# Patient Record
Sex: MALE | NOT HISPANIC OR LATINO
[De-identification: names, ages, dates, MRNs, and addresses within clinical notes are randomized per-mention and may not be internally consistent; named-entity substitution may affect disease eponyms.]

---

## 2024-09-30 ENCOUNTER — DASHBOARD ENCOUNTERS (OUTPATIENT)
Age: 74
End: 2024-09-30

## 2024-09-30 ENCOUNTER — OFFICE VISIT (OUTPATIENT)
Dept: URBAN - METROPOLITAN AREA CLINIC 54 | Facility: CLINIC | Age: 74
End: 2024-09-30

## 2024-09-30 VITALS
SYSTOLIC BLOOD PRESSURE: 168 MMHG | TEMPERATURE: 97.6 F | BODY MASS INDEX: 25.73 KG/M2 | HEART RATE: 77 BPM | DIASTOLIC BLOOD PRESSURE: 71 MMHG | HEIGHT: 72 IN | WEIGHT: 190 LBS

## 2024-09-30 RX ORDER — TAMSULOSIN HYDROCHLORIDE 0.4 MG/1
1 CAPSULE CAPSULE ORAL ONCE A DAY
Status: ACTIVE | COMMUNITY

## 2024-09-30 RX ORDER — BENAZEPRIL HYDROCHLORIDE 40 MG/1
1 TABLET TABLET ORAL ONCE A DAY
Status: ACTIVE | COMMUNITY

## 2024-09-30 RX ORDER — ALLOPURINOL 300 MG/1
1 TABLET TABLET ORAL ONCE A DAY
Status: ACTIVE | COMMUNITY

## 2024-09-30 NOTE — HPI-TODAY'S VISIT:
Patient is a 75 yo man referred by Dr. Heidy Johnson for above reasons. A copy of this document will be sent ro referring provider.He was in his usual state of health until June 6, 2024 when he presented to the hospital with painless jaundice and dark urine.  His liver function tests were noted to be deranged.  ALT 2529, AST 1351 and total bilirubin of 6 g/dL.  His alkaline phosphatase was normal.  He underwent CT of the abdomen and MRCP which was negative for any mass lesions, biliary obstruction or portal venous thrombosis.  His viral markers were negative.  This was attributed to drug-induced liver injury from unknown agent.  He admitted to taking 2 herbal supplements Quercetin and Luteolin, both of which are not associated with clinically significant drug-induced liver injury.  He was discharged with a declining total bilirubin.  He reports subsequent normalization of LFTs a recheck with his primary care physician.  He then underwent a umbilical hernia repair in July 2024.  MRI on June 7, 2024 revealed a 1.1 cm lesion in the body of pancreas originating from the main pancreatic duct suggestive of a IPMN.  He is currently asymptomatic with no significant complaints.  There is no family history of pancreatic cancer.